# Patient Record
Sex: FEMALE | Race: OTHER | Employment: UNEMPLOYED | URBAN - METROPOLITAN AREA
[De-identification: names, ages, dates, MRNs, and addresses within clinical notes are randomized per-mention and may not be internally consistent; named-entity substitution may affect disease eponyms.]

---

## 2021-06-18 NOTE — PROGRESS NOTES
2021 : Sihra Parada (: 1980) is a 42 y.o. female, new patient, here for evaluation of the following chief complaint(s): Annual Wellness Visit       ASSESSMENT/PLAN:  Below is the assessment and plan developed based on review of pertinent history, physical exam, labs, studies, and medications. Mammogram - [facility]. 1. Physical exam  -     HIV 1/2 AG/AB, 4TH GENERATION,W RFLX CONFIRM; Future  -     RPR; Future  -     HSV 1/2 AB, IGM; Future  -     Howie Cristiano / GC-AMPLIFIED; Future    Return for [facility] clinic (last one 14 years ago). SUBJECTIVE/OBJECTIVE:  HPI Nonfasting glucose 86, hemoglobin 13.5. No diabetes, no anemia. Asking for STI check. She is present today with her . No results found for any visits on 21. Review of Systems: Positive for vaginal itching a week ago that lasted 1 day and has resolved. Negative for: fever, chest pain, shortness of breath, leg swelling. Social History:  reports that she has never smoked. She has never used smokeless tobacco. She reports previous alcohol use. She reports that she does not use drugs. No past medical history on file. none    Past Surgical History:   Procedure Laterality Date    HX  SECTION      x4     C-sections x 4, last one 14 years ago. Family History   Problem Relation Age of Onset    Diabetes Mother         in her 62s     Mom has diabetes, discovered when she was more than 61years old. Current Medications:   4 months ago she had an injection of vitamins. Betoyecta. B complex. She was feeling tired. She had a total of 5 injections, every 3 days. States she did not notice a significant difference. No current outpatient medications on file.      Physical Examination:   Vitals:    21 0931   BP: 121/77   Pulse: 68   Temp: 98 °F (36.7 °C)   TempSrc: Temporal   SpO2: 98%   Weight: 143 lb 3.2 oz (65 kg)   Height: 4' 5.94\" (1.37 m)     General appearance - well developed, no acute distress. Chest - clear to auscultation. Heart - regular rate and rhythm without murmurs, rubs, or gallops. Abdomen - bowel sounds present x 4, NT, ND  Extremities - distal sensation intact, no CCE. An electronic signature was used to authenticate this note.   -- Cherry Aguirre NP

## 2021-06-25 ENCOUNTER — TELEPHONE (OUTPATIENT)
Dept: FAMILY MEDICINE CLINIC | Age: 41
End: 2021-06-25

## 2021-06-25 ENCOUNTER — OFFICE VISIT (OUTPATIENT)
Dept: FAMILY MEDICINE CLINIC | Age: 41
End: 2021-06-25

## 2021-06-25 ENCOUNTER — HOSPITAL ENCOUNTER (OUTPATIENT)
Dept: LAB | Age: 41
Discharge: HOME | End: 2021-06-25

## 2021-06-25 VITALS
WEIGHT: 143.2 LBS | OXYGEN SATURATION: 98 % | BODY MASS INDEX: 33.14 KG/M2 | HEART RATE: 68 BPM | SYSTOLIC BLOOD PRESSURE: 121 MMHG | HEIGHT: 55 IN | TEMPERATURE: 98 F | DIASTOLIC BLOOD PRESSURE: 77 MMHG

## 2021-06-25 DIAGNOSIS — Z13.9 ENCOUNTER FOR SCREENING: ICD-10-CM

## 2021-06-25 DIAGNOSIS — Z00.00 PHYSICAL EXAM: Primary | ICD-10-CM

## 2021-06-25 PROCEDURE — 87389 HIV-1 AG W/HIV-1&-2 AB AG IA: CPT

## 2021-06-25 PROCEDURE — 86694 HERPES SIMPLEX NES ANTBDY: CPT

## 2021-06-25 PROCEDURE — 36415 COLL VENOUS BLD VENIPUNCTURE: CPT

## 2021-06-25 PROCEDURE — 99203 OFFICE O/P NEW LOW 30 MIN: CPT | Performed by: NURSE PRACTITIONER

## 2021-06-25 PROCEDURE — 86592 SYPHILIS TEST NON-TREP QUAL: CPT

## 2021-06-25 PROCEDURE — 87491 CHLMYD TRACH DNA AMP PROBE: CPT

## 2021-06-25 NOTE — TELEPHONE ENCOUNTER
Sonia Wong  Call main office 6/25/2021  Stated that she dial the phone number [facility] and cvan answer her. .  patient is so confused since every time she  Call she is directed to cvan  Please call patient .     Thank you   Giulia Olvera

## 2021-06-25 NOTE — PROGRESS NOTES
[facility] was not able to post the POC for the patient.   Hemoglobin is 13.5  Fasting Glucosa is 86

## 2021-06-25 NOTE — TELEPHONE ENCOUNTER
Int # [id number]. Tc to the pt returning her call. She stated she is calling this nurse to schedule her Mammo and PAP smear. The pt was given the [facility] phone number and the process was explained to her. She verbalized understanding.  Ana Ochoa RN

## 2021-06-26 LAB
HIV 1+2 AB+HIV1 P24 AG SERPL QL IA: NONREACTIVE
HIV12 RESULT COMMENT, HHIVC: NORMAL

## 2021-06-28 LAB
C TRACH RRNA SPEC QL NAA+PROBE: NEGATIVE
N GONORRHOEA RRNA SPEC QL NAA+PROBE: NEGATIVE
PLEASE NOTE:, 188601: NORMAL
RPR SER QL: NONREACTIVE
SPECIMEN SOURCE: NORMAL

## 2021-06-29 LAB
HSV1 IGG SER IA-ACNC: 15.3 INDEX (ref 0–0.9)
HSV1+2 IGM SER IA-ACNC: 1.27 RATIO (ref 0–0.9)
HSV2 IGG SER IA-ACNC: <0.91 INDEX (ref 0–0.9)

## 2021-06-29 NOTE — PROGRESS NOTES
I have sent a message to registrar to please schedule patient with Becky Vera per her request to discuss lab results.  Ashely Hensley RN

## 2021-07-09 ENCOUNTER — TELEPHONE (OUTPATIENT)
Dept: FAMILY MEDICINE CLINIC | Age: 41
End: 2021-07-09

## 2021-07-09 NOTE — LETTER
7/9/2021 2:13 PM    Ms[phone] [id number] [facility] [id number]      Dear Ms Sharp ,    Please call the office to discuss your lab results. [address] [facility]. [phone].         Sincerely,      Harsh Fernández NP / Saman Hyman RN

## 2021-07-09 NOTE — TELEPHONE ENCOUNTER
The pt was printed and mailed a letter asking her to please call the office. This was sent to her per the provider to discuss her lab results.  Tereza Gaines RN

## 2021-09-02 ENCOUNTER — TRANSCRIBE ORDER (OUTPATIENT)
Dept: SCHEDULING | Age: 41
End: 2021-09-02

## 2021-09-02 DIAGNOSIS — Z12.31 VISIT FOR SCREENING MAMMOGRAM: Primary | ICD-10-CM

## 2021-09-14 ENCOUNTER — HOSPITAL ENCOUNTER (OUTPATIENT)
Dept: LAB | Age: 41
Discharge: HOME | End: 2021-09-14

## 2021-09-14 ENCOUNTER — HOSPITAL ENCOUNTER (OUTPATIENT)
Dept: MAMMOGRAPHY | Age: 41
Discharge: HOME | End: 2021-09-14

## 2021-09-14 ENCOUNTER — OFFICE VISIT (OUTPATIENT)
Dept: FAMILY PLANNING/WOMEN'S HEALTH CLINIC | Age: 41
End: 2021-09-14

## 2021-09-14 DIAGNOSIS — Z91.89 AT RISK FOR DOMESTIC VIOLENCE: ICD-10-CM

## 2021-09-14 DIAGNOSIS — Z12.31 VISIT FOR SCREENING MAMMOGRAM: ICD-10-CM

## 2021-09-14 DIAGNOSIS — Z01.419 ENCOUNTER FOR WELL WOMAN EXAM: Primary | ICD-10-CM

## 2021-09-14 DIAGNOSIS — N93.8 DUB (DYSFUNCTIONAL UTERINE BLEEDING): ICD-10-CM

## 2021-09-14 PROCEDURE — 77067 SCR MAMMO BI INCL CAD: CPT

## 2021-09-14 PROCEDURE — 87624 HPV HI-RISK TYP POOLED RSLT: CPT

## 2021-09-14 PROCEDURE — 88175 CYTOPATH C/V AUTO FLUID REDO: CPT
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
## 2021-09-14 NOTE — Clinical Note
Please call pt and schedule an appt with Dr Albina Handy on a Friday first appt or last one pt requests

## 2021-09-14 NOTE — PROGRESS NOTES
[facility]ERY WOM[facility] LIFE HISTORY QUESTIONNAIRE       No Yes Comments   Has a doctor ever seen or felt anything wrong with your breast? [x]                                  []                                     Have you ever had a breast biopsy? [x]                                  []                                          When and where was last mammogram performed? [facility]    Have you ever been told that there was a problem on your mammogram?   No Yes Comments   [x]                                  []                                       Do you have breast implants? No Yes Comments   [x]                                  []                                       When was your last Pap test performed? 14 years ago [facility]. Have you ever had an abnormal Pap test?   No Yes Comments   [x]                                  []                                       Have you had a hysterectomy? No Yes Comments (why)   [x]                                  []                                  Per [facility] pt     Have you been through menopause? No Yes Date of LMP   [x]                                  []                                  14 years ago     Did your mother take HUSSAIN? No Yes Unknown   [x]                                  []                                       Do you have a history of HIV exposure? No Yes    [x]                                  []                                       Have you ever been diagnosed with any type of Cancer   No Yes Comments (type,when,where,type of treatment   [x]                                  []                                          Has a family member been diagnosed with breast or ovarian cancer?    No Yes Comments (which family members, and type   [x]                                  []                                       Are you taking hormone replacement therapy (HRT)     No Yes Comments   [x]                                  [] How many times have you been pregnant? 4    Number of live births ? 4    Are you experiencing any of the following? No Yes Comments   Nipple Discharge [x]                                  []                                     Breast Lump/Masses [x]                                  []                                     Breast Skin Changes [x]                                  []                                          No Yes Comments   Vaginal Discharge [x]                                  []                                     Abnormal/unusual vaginal bleeding [x]                                  []                                         Are you experiencing any other health problems? Pt states no period for a few months. Some spotting 8 days ago. Asked if pt could be pregnant. Pt states 14 years ago after having baby she had surgery. Pt states the surgery she signed for was \"tubes tied\" But went to [facility] and they told her she did not need paps because no cervix. Age at first period? 14  Age at first birth? 20    Ht--4'5\"    Wt--140lbs    [facility]  number is [phone].

## 2021-09-20 NOTE — PROGRESS NOTES
Neg cytology, neg HPV= 5 year f/up pap. Please inform the pt and remind her to make appt at [facility] if she would like to discuss  the other issues.  thanks

## 2022-11-24 ENCOUNTER — HOSPITAL ENCOUNTER (EMERGENCY)
Age: 42
Discharge: STILL ADMITTED OR ADMINISTRATIVE | End: 2022-11-24